# Patient Record
Sex: MALE | Race: WHITE
[De-identification: names, ages, dates, MRNs, and addresses within clinical notes are randomized per-mention and may not be internally consistent; named-entity substitution may affect disease eponyms.]

---

## 2019-01-28 ENCOUNTER — HOSPITAL ENCOUNTER (EMERGENCY)
Dept: HOSPITAL 97 - ER | Age: 24
Discharge: HOME | End: 2019-01-28
Payer: COMMERCIAL

## 2019-01-28 DIAGNOSIS — R11.2: Primary | ICD-10-CM

## 2019-01-28 DIAGNOSIS — R19.7: ICD-10-CM

## 2019-01-28 PROCEDURE — 99283 EMERGENCY DEPT VISIT LOW MDM: CPT

## 2019-01-28 NOTE — EDPHYS
Physician Documentation                                                                           

 [facility]                                                                

Name: Kalia Irving                                                                              

Age: 23 yrs                                                                                       

Sex: Male                                                                                         

: 1995                                                                                   

MRN: [id number]                                                                                   

Arrival Date: 2019                                                                          

Time: 13:18                                                                                       

Account#: [id number]                                                                            

Bed 25                                                                                            

Private MD: None, None                                                                            

ED Physician Ayan Head                                                                      

HPI:                                                                                              

                                                                                             

14:08 This 23 yrs old  Male presents to ER via Ambulatory with complaints of         kb  

      Nausea/Vomiting/Diarrhea.                                                                   

14:08 The patient presents to the emergency department with nausea, vomiting, diarrhea.       kb  

      Onset: The symptoms/episode began/occurred 3 day(s) ago. Possible causes: unknown. The      

      symptoms are aggravated by nothing. The symptoms are alleviated by nothing. Associated      

      signs and symptoms: Pertinent positives: diarrhea, nausea, vomiting, Pertinent              

      negatives: abdominal pain, anorexia, belching, constipation, dysuria, fever,                

      flatulence, GI bleeding, hematuria. Severity of symptoms: At their worst the symptoms       

      were moderate in the emergency department the symptoms are unchanged. The patient has       

      not experienced similar symptoms in the past. The patient has not recently seen a           

      physician. Pt reports n/v/d for 3 days. States "It's no big deal, nothing serious. I        

      just threw up at work today so they made me come in to get checked out." .                  

                                                                                                  

Historical:                                                                                       

- Allergies:                                                                                      

13:29 No Known Allergies;                                                                     sv  

- PMHx:                                                                                           

13:29 None;                                                                                   sv  

- PSHx:                                                                                           

13:29 titanium plate placed in Left foot after previous foot fracture;                        sv  

                                                                                                  

- Immunization history:: Adult Immunizations up to date.                                          

- Social history:: Smoking status: Patient uses tobacco products, denies chronic                  

  smoking, but will smoke occasionally.                                                           

- Ebola Screening: : No symptoms or risks identified at this time.                                

                                                                                                  

                                                                                                  

ROS:                                                                                              

14:10 Constitutional: Negative for fever, chills, and weight loss, Cardiovascular: Negative   kb  

      for chest pain, palpitations, and edema, Respiratory: Negative for shortness of breath,     

      cough, wheezing, and pleuritic chest pain, Back: Negative for injury and pain, :          

      Negative for injury, bleeding, discharge, and swelling, MS/Extremity: Negative for          

      injury and deformity, Skin: Negative for injury, rash, and discoloration, Neuro:            

      Negative for headache, weakness, numbness, tingling, and seizure.                           

14:10 Abdomen/GI: Positive for nausea, vomiting, and diarrhea, Negative for abdominal pain,       

      constipation, abdominal cramps, abdominal distension, anorexia.                             

                                                                                                  

Exam:                                                                                             

14:10 Constitutional:  This is a well developed, well nourished patient who is awake, alert,  kb  

      and in no acute distress. Head/Face:  Normocephalic, atraumatic. Chest/axilla:  Normal      

      chest wall appearance and motion.  Nontender with no deformity.  No lesions are             

      appreciated. Cardiovascular:  Regular rate and rhythm with a normal S1 and S2.  No          

      gallops, murmurs, or rubs.  Normal PMI, no JVD.  No pulse deficits. Respiratory:  Lungs     

      have equal breath sounds bilaterally, clear to auscultation and percussion.  No rales,      

      rhonchi or wheezes noted.  No increased work of breathing, no retractions or nasal          

      flaring. Abdomen/GI:  Soft, non-tender, with normal bowel sounds.  No distension or         

      tympany.  No guarding or rebound.  No evidence of tenderness throughout. Skin:  Warm,       

      dry with normal turgor.  Normal color with no rashes, no lesions, and no evidence of        

      cellulitis. MS/ Extremity:  Pulses equal, no cyanosis.  Neurovascular intact.  Full,        

      normal range of motion. Neuro:  Awake and alert, GCS 15, oriented to person, place,         

      time, and situation.  Cranial nerves II-XII grossly intact.  Motor strength 5/5 in all      

      extremities.  Sensory grossly intact.  Cerebellar exam normal.  Normal gait.                

                                                                                                  

Vital Signs:                                                                                      

13:29  / 68; Pulse 69; Resp 18; Temp 97.4; Pulse Ox 100% ; Weight 117.93 kg; Height 6   sv  

      ft. 3 in. (190.50 cm); Pain 6/10;                                                           

14:31  / 61; Pulse 68; Resp 17; Pulse Ox 99% on R/A;                                    aj  

13:29 Body Mass Index 32.50 (117.93 kg, 190.50 cm)                                            sv  

                                                                                                  

MDM:                                                                                              

13:31 Patient medically screened.                                                             kb  

14:07 Data reviewed: vital signs, nurses notes. Data interpreted: Pulse oximetry: on room air kb  

      is 100 %. Interpretation: normal. Counseling: I had a detailed discussion with the          

      patient and/or guardian regarding: the historical points, exam findings, and any            

      diagnostic results supporting the discharge/admit diagnosis, the need for outpatient        

      follow up, a family practitioner, to return to the emergency department if symptoms         

      worsen or persist or if there are any questions or concerns that arise at home.             

14:10 ED course: Pt does not want blood work done, doesn't think it is necessary.             kb  

                                                                                                  

                                                                                             

13:36 Order name: PO challenge; Complete Time: 13:59                                          kb  

                                                                                                  

Administered Medications:                                                                         

13:47 Drug: Zofran 4 mg Route: PO;                                                            aj  

13:59 Follow up: Response: Nausea is decreased                                                aj  

                                                                                                  

                                                                                                  

Disposition:                                                                                      

15:19 Co-signature as Attending Physician, Ayan Head MD I agree with the assessment and  [facility] 

      plan of care.                                                                               

                                                                                                  

Disposition:                                                                                      

19 14:11 Discharged to Home. Impression: Nausea and vomiting, Diarrhea, unspecified.        

- Condition is Stable.                                                                            

- Discharge Instructions: Food Choices to Help Relieve Diarrhea, Adult, Viral                     

  Gastroenteritis, Adult, Easy-to-Read, Nausea and Vomiting, Adult, Easy-to-Read,                 

  Diarrhea, Adult, Easy-to-Read.                                                                  

- Prescriptions for Zofran 4 mg Oral Tablet - take 1 tablet by ORAL route every 6 hours           

  As needed; 20 tablet.                                                                           

- Medication Reconciliation Form, Thank You Letter, Antibiotic Education, Prescription            

  Opioid Use form.                                                                                

- Work release form (19 16:08).                                                         sv  

- Follow up: Emergency Department; When: As needed; Reason: Worsening of condition.               

  Follow up: Private Physician; When: 2 - 3 days; Reason: Recheck today's complaints,             

  Continuance of care, Re-evaluation by your physician.                                           

                                                                                                  

                                                                                                  

                                                                                                  

Signatures:                                                                                       

Pilar Ramon, JAZ-C                 FNP-Dian Eng RN RN sv Myers, Amanda, RN RN aj Anderson, Corey, MD MD   [facility]                                                  

                                                                                                  

Corrections: (The following items were deleted from the chart)                                    

14:32 14:11 2019 14:11 Discharged to Home. Impression: Nausea and vomiting; Diarrhea,   aj  

      unspecified. Condition is Stable. Forms are Medication Reconciliation Form, Thank You       

      Letter, Antibiotic Education, Prescription Opioid Use. Follow up: Emergency Department;     

      When: As needed; Reason: Worsening of condition. Follow up: Private Physician; When: 2      

      - 3 days; Reason: Recheck today's complaints, Continuance of care, Re-evaluation by         

      your physician. kb                                                                          

                                                                                                  

**************************************************************************************************

## 2019-01-28 NOTE — ER
Nurse's Notes                                                                                     

 [facility]                                                                

Name: Kalia Irving                                                                              

Age: 23 yrs                                                                                       

Sex: Male                                                                                         

: 1995                                                                                   

MRN: [id number]                                                                                   

Arrival Date: 2019                                                                          

Time: 13:18                                                                                       

Account#: [id number]                                                                            

Bed 25                                                                                            

Private MD: None, None                                                                            

Diagnosis: Nausea and vomiting;Diarrhea, unspecified                                              

                                                                                                  

Presentation:                                                                                     

                                                                                             

13:28 Presenting complaint: Patient states: n/v/d x 3 days. Transition of care: patient was   sv  

      not received from another setting of care. Onset of symptoms was 2019. Care     

      prior to arrival: None.                                                                     

13:28 Method Of Arrival: Ambulatory                                                           sv  

13:28 Acuity: DEEPA 3                                                                           sv  

14:32 Risk Assessment: Do you want to hurt yourself or someone else? Patient reports no       aj  

      desire to harm self or others. Initial Sepsis Screen: Does the patient meet any 2           

      criteria? No. Patient's initial sepsis screen is negative. Does the patient have a          

      suspected source of infection? No. Patient's initial sepsis screen is negative.             

                                                                                                  

Triage Assessment:                                                                                

13:29 General: Appears in no apparent distress. comfortable, Behavior is calm, cooperative,   sv  

      appropriate for age. Neuro: Level of Consciousness is awake, alert, obeys commands,         

      Oriented to person, place, time, situation, Gait is steady. Respiratory: Respiratory        

      effort is even, unlabored, Respiratory pattern is regular, symmetrical. GI: Reports         

      diarrhea, nausea, vomiting.                                                                 

                                                                                                  

Historical:                                                                                       

- Allergies:                                                                                      

13:29 No Known Allergies;                                                                     sv  

- PMHx:                                                                                           

13:29 None;                                                                                   sv  

- PSHx:                                                                                           

13:29 titanium plate placed in Left foot after previous foot fracture;                        sv  

                                                                                                  

- Immunization history:: Adult Immunizations up to date.                                          

- Social history:: Smoking status: Patient uses tobacco products, denies chronic                  

  smoking, but will smoke occasionally.                                                           

- Ebola Screening: : No symptoms or risks identified at this time.                                

                                                                                                  

                                                                                                  

Screenin:47 Abuse screen: Denies threats or abuse. Denies injuries from another. Nutritional        aj  

      screening: No deficits noted. Tuberculosis screening: No symptoms or risk factors           

      identified. Fall Risk None identified.                                                      

                                                                                                  

Assessment:                                                                                       

13:47 General: Appears in no apparent distress. comfortable, Behavior is calm, cooperative,   aj  

      appropriate for age. Pain: Denies pain. Neuro: Level of Consciousness is awake, alert,      

      obeys commands, Oriented to person, place, time, situation, Appropriate for age.            

      Respiratory: Airway is patent Respiratory effort is even, unlabored, Respiratory            

      pattern is regular, symmetrical. GI: Reports diarrhea, nausea, vomiting. GI: Abdomen is     

      flat, non-distended. Derm: Skin is intact, is healthy with good turgor, Skin is pink,       

      warm \T\ dry. normal.                                                                       

                                                                                                  

Vital Signs:                                                                                      

13:29  / 68; Pulse 69; Resp 18; Temp 97.4; Pulse Ox 100% ; Weight 117.93 kg; Height 6   sv  

      ft. 3 in. (190.50 cm); Pain 6/10;                                                           

14:31  / 61; Pulse 68; Resp 17; Pulse Ox 99% on R/A;                                    aj  

13:29 Body Mass Index 32.50 (117.93 kg, 190.50 cm)                                            sv  

                                                                                                  

ED Course:                                                                                        

13:18 Patient arrived in ED.                                                                  dl4 

13:18 None, None is Private Physician.                                                        dl4 

13:28 Triage completed.                                                                       sv  

13:29 Arm band placed on.                                                                     sv  

13:30 [facility]                                                        kb  

13:30 Ayan Head MD is Attending Physician.                                             kb  

13:39 Mireya Mendoza, RN is Primary Nurse.                                                     aj  

13:47 Patient has correct armband on for positive identification. Bed in low position.        aj  

13:47 No provider procedures requiring assistance completed. Patient did not have IV access   aj  

      during this emergency room visit.                                                           

                                                                                                  

Administered Medications:                                                                         

13:47 Drug: Zofran 4 mg Route: PO;                                                            aj  

13:59 Follow up: Response: Nausea is decreased                                                aj  

                                                                                                  

                                                                                                  

Outcome:                                                                                          

14:11 Discharge ordered by MD.                                                                kb  

14:31 Discharged to home ambulatory.                                                          aj  

14:31 Condition: good                                                                             

14:31 Discharge instructions given to patient, Instructed on discharge instructions, follow       

      up and referral plans. medication usage, Demonstrated understanding of instructions,        

      follow-up care, medications, Prescriptions given X 1.                                       

14:32 Patient left the ED.                                                                    aj  

                                                                                                  

Signatures:                                                                                       

Pilar Ramon FNP-C FNP-Ckb Verde, Stephanie, RN RN sv Myers, Amanda, RN RN aj Luna, David                                  dl4                                                  

                                                                                                  

Corrections: (The following items were deleted from the chart)                                    

13:30 13:29 Pulse 69bpm; Resp 18bpm; Pulse Ox 100%; Temp 97.4F; 117.93 kg; Height 6 ft. 3     sv  

      in.; BMI: 32.5; Pain 6/10; sv                                                               

                                                                                                  

**************************************************************************************************

## 2021-06-02 ENCOUNTER — HOSPITAL ENCOUNTER (EMERGENCY)
Dept: HOSPITAL 97 - ER | Age: 26
Discharge: HOME | End: 2021-06-02
Payer: COMMERCIAL

## 2021-06-02 VITALS — TEMPERATURE: 98.9 F | OXYGEN SATURATION: 99 % | DIASTOLIC BLOOD PRESSURE: 76 MMHG | SYSTOLIC BLOOD PRESSURE: 137 MMHG

## 2021-06-02 DIAGNOSIS — F10.129: Primary | ICD-10-CM

## 2021-06-02 DIAGNOSIS — T40.7X5A: ICD-10-CM

## 2021-06-02 LAB
ALBUMIN SERPL BCP-MCNC: 4.5 G/DL (ref 3.4–5)
ALP SERPL-CCNC: 72 U/L (ref 45–117)
ALT SERPL W P-5'-P-CCNC: 30 U/L (ref 12–78)
AST SERPL W P-5'-P-CCNC: 23 U/L (ref 15–37)
BUN BLD-MCNC: 14 MG/DL (ref 7–18)
GLUCOSE SERPLBLD-MCNC: 98 MG/DL (ref 74–106)
HCT VFR BLD CALC: 44.6 % (ref 39.6–49)
INR BLD: 1.14
LYMPHOCYTES # SPEC AUTO: 2.1 K/UL (ref 0.7–4.9)
METHAMPHET UR QL SCN: NEGATIVE
PMV BLD: 10.4 FL (ref 7.6–11.3)
POTASSIUM SERPL-SCNC: 3.5 MMOL/L (ref 3.5–5.1)
RBC # BLD: 5.11 M/UL (ref 4.33–5.43)
THC SERPL-MCNC: POSITIVE NG/ML

## 2021-06-02 PROCEDURE — 85730 THROMBOPLASTIN TIME PARTIAL: CPT

## 2021-06-02 PROCEDURE — 85610 PROTHROMBIN TIME: CPT

## 2021-06-02 PROCEDURE — 85025 COMPLETE CBC W/AUTO DIFF WBC: CPT

## 2021-06-02 PROCEDURE — 51702 INSERT TEMP BLADDER CATH: CPT

## 2021-06-02 PROCEDURE — 80076 HEPATIC FUNCTION PANEL: CPT

## 2021-06-02 PROCEDURE — 36415 COLL VENOUS BLD VENIPUNCTURE: CPT

## 2021-06-02 PROCEDURE — 99291 CRITICAL CARE FIRST HOUR: CPT

## 2021-06-02 PROCEDURE — 80320 DRUG SCREEN QUANTALCOHOLS: CPT

## 2021-06-02 PROCEDURE — 96374 THER/PROPH/DIAG INJ IV PUSH: CPT

## 2021-06-02 PROCEDURE — 81003 URINALYSIS AUTO W/O SCOPE: CPT

## 2021-06-02 PROCEDURE — 80048 BASIC METABOLIC PNL TOTAL CA: CPT

## 2021-06-02 PROCEDURE — 82947 ASSAY GLUCOSE BLOOD QUANT: CPT

## 2021-06-02 PROCEDURE — 96361 HYDRATE IV INFUSION ADD-ON: CPT

## 2021-06-02 PROCEDURE — 80329 ANALGESICS NON-OPIOID 1 OR 2: CPT

## 2021-06-02 PROCEDURE — 80307 DRUG TEST PRSMV CHEM ANLYZR: CPT

## 2021-06-02 PROCEDURE — 99292 CRITICAL CARE ADDL 30 MIN: CPT

## 2021-06-02 PROCEDURE — 93005 ELECTROCARDIOGRAM TRACING: CPT

## 2021-06-02 NOTE — EKG
Test Date:    2021-06-02               Test Time:    03:23:17

Technician:   LAMINE                                     

                                                     

MEASUREMENT RESULTS:                                       

Intervals:                                           

Rate:         62                                     

AR:           164                                    

QRSD:         100                                    

QT:           428                                    

QTc:          434                                    

Axis:                                                

P:            48                                     

AR:           164                                    

QRS:          49                                     

T:            67                                     

                                                     

INTERPRETIVE STATEMENTS:                                       

                                                     

Normal sinus rhythm

Normal ECG

No previous ECG available for comparison



Electronically Signed On 06-02-21 11:53:30 CDT by Yusuf Romero

## 2021-06-02 NOTE — ER
Nurse's Notes                                                                                     

 [facility]                                                                 

Name: Kalia Irving                                                                              

Age: 25 yrs                                                                                       

Sex: Male                                                                                         

: 1995                                                                                   

MRN: [id number]                                                                                   

Arrival Date: 2021                                                                          

Time: 03:12                                                                                       

Account#: [id number]                                                                            

Bed 3                                                                                             

Private MD:                                                                                       

Diagnosis: Alcohol abuse with intoxication;Adverse effect of marijuana use                        

                                                                                                  

Presentation:                                                                                     

                                                                                             

03:15 Chief complaint: Parent and/or Guardian states: He was at a friends house tonight       jb4 

      drinking. They said he had a lot to drink on an empty stomach. Coronavirus screen:          

      Client denies travel out of the [place] in the last 14 days. At this time, the client does     

      not indicate any symptoms associated with coronavirus-19. Ebola Screen: No symptoms or      

      risks identified at this time.                                                              

03:15 Method Of Arrival: Wheelchair                                                           jb4 

03:15 Initial Sepsis Screen: Does the patient meet any 2 criteria? No. Patient's initial      jb4 

      sepsis screen is negative. Does the patient have a suspected source of infection? No.       

      Patient's initial sepsis screen is negative. Risk Assessment: Do you want to hurt           

      yourself or someone else? Patient reports no desire to harm self or others. Onset of        

      symptoms was 2021. Transition of care: patient was not received from another       

      setting of care.                                                                            

03:15 Acuity: DEEPA 1                                                                           jb4 

                                                                                                  

Historical:                                                                                       

- Allergies:                                                                                      

03:40 No Known Allergies;                                                                     jb4 

- Home Meds:                                                                                      

03:40 None [Active];                                                                          jb4 

- PMHx:                                                                                           

03:40 None;                                                                                   jb4 

- PSHx:                                                                                           

03:40 None;                                                                                   jb4 

                                                                                                  

- Immunization history:: Adult Immunizations unknown.                                             

- Social history:: Smoking status: unknown Patient uses alcohol, street drugs,                    

  marijuana.                                                                                      

- Family history:: not pertinent.                                                                 

- Hospitalizations: : No recent hospitalization is reported.                                      

- History obtained from: mother.                                                                  

                                                                                                  

                                                                                                  

Screenin:30 Abuse screen: Denies threats or abuse. Nutritional screening: No deficits noted.        jb4 

      Tuberculosis screening: No symptoms or risk factors identified. Fall Risk Gait-             

      Impaired (20 pts.). Mental Status- Overestimates/Forgets Limitations (15 pts.). Total       

      Redman Fall Scale indicates High Risk Score (45 or more points). Fall prevention             

      measures have been instituted. Side Rails Up X 2 Placed Close to Nursing Station            

      Frequent Obs/Assessments Occuring.                                                          

                                                                                                  

Assessment:                                                                                       

03:15 General: Appears distressed, ill, obese, Behavior is uncooperative, Smells of alcohol,  jb4 

      Smoke. Pain: Unable to use pain scale. FLACC scale score is 0 out of 10. Neuro: Level       

      of Consciousness is lethargic, Oriented to person, place, time, situation.                  

      Cardiovascular: Skin is diaphoretic and warm. Respiratory: Airway is compromised            

      Respiratory effort is gasping, weak, Respiratory pattern is symmetrical, periods of         

      apnea noted with periods of regular breathing. GI: No signs and/or symptoms were            

      reported involving the gastrointestinal system. : No signs and/or symptoms were           

      reported regarding the genitourinary system. EENT: No signs and/or symptoms were            

      reported regarding the EENT system. Derm: Skin is intact, Skin is diaphoretic, Skin is      

      normal, Skin temperature is warm. Musculoskeletal: Circulation, motion, and sensation       

      intact. Range of motion: intact in all extremities.                                         

03:30 Reassessment: Nasal trumpet inserted to left nare per providers orders.                 jb4 

03:45 Reassessment: Pt's skin is now warm and dry. Respirations are even and unlabored. No    jb4 

      s/s of distress noted.                                                                      

04:45 Reassessment: Patient and/or family updated on plan of care and expected duration. Pain jb4 

      level reassessed. Pt awakens more easily, remain lethargic. Respirations continue to be     

      even and unlabored with no s/s of pain or distress noted.                                   

05:15 Reassessment: Pt is now awake, alert and oriented x4. Responds spontaneously to voice   jb4 

      and other sounds. Is requesting to have nasal trumpet removed. Provider notified,           

      Provider okayed nasal trumpet to be removed.                                                

07:20 Reassessment: pt laying in bed with eyes closed, respirations even and unlabored, will  jl7 

      be discharged once his mom arrives.                                                         

08:00 Reassessment: Pt's mom here to take pt home, woke pt up, removed Hitchcock and discharged   jl7 

      pt home via wheelchair with his mom.                                                        

                                                                                                  

Vital Signs:                                                                                      

03:15  / 58; Pulse 60; Resp 13; Temp 96.7(O); Pulse Ox 98% on R/A; Weight 117.93 kg     jb4 

      (R); Height 6 ft. 1 in. (185.42 cm); Pain 0/10;                                             

05:00  / 97; Pulse 58; Resp 19; Temp 98.5(C); Pulse Ox 100% on R/A;                     jb4 

05:30  / 99; Pulse 63; Resp 23; Temp 98.9(C); Pulse Ox 100% on R/A;                     jb4 

07:20  / 58; Pulse 69; Resp 19 S; Temp 99.1(C); Pulse Ox 98% on R/A;                    jl7 

08:00  / 76; Pulse 73; Resp 15 S; Temp 98.9(C); Pulse Ox 99% on R/A;                    jl7 

03:15 Body Mass Index 34.30 (117.93 kg, 185.42 cm)                                            jb4 

                                                                                                  

ED Course:                                                                                        

03:12 Patient arrived in ED.                                                                  am4 

03:13 Adalberto Dykes MD is Attending Physician.                                                rn  

03:15 Initial lab(s) drawn, by me, sent to lab. Inserted saline lock: 18 gauge in right       jb4 

      antecubital area, using aseptic technique. Blood collected.                                 

03:25 EKG done.                                                                               jb4 

03:30 Patient has correct armband on for positive identification. Placed in gown. Bed in low  jb4 

      position. Call light in reach. Side rails up X 1. Cardiac monitor on. Pulse ox on. NIBP     

      on.                                                                                         

03:30 Hitchcock cath inserted, using sterile technique, 16 Fr., by me, balloon inflated, to       jb4 

      gravity drainage, urine specimen collected. returned eulogio urine. Patient tolerated         

      well. Patient maintains SpO2 saturation greater than 95% on room air. O2 via Nasal          

      trumpet inserted per Dr. Dykes.                                                             

03:39 Triage completed.                                                                       jb4 

03:40 Arm band placed on right wrist.                                                         jb4 

03:48 Finesse Alvarez, RN is Primary Nurse.                                                     jb4 

07:37 Primary Nurse role handed off by Finesse Alvarez, RN                                      bd  

08:00 No provider procedures requiring assistance completed. Hitchcock cath removed intact,       jl7 

      balloon deflated. IV discontinued, intact, bleeding controlled, No redness/swelling at      

      site. Pressure dressing applied.                                                            

                                                                                                  

Administered Medications:                                                                         

04:14 Drug: NS 0.9% 1000 ml Route: IV; Rate: 1000 ml; Site: left antecubital;                 jb4 

07:00 Follow up: IV Intake: 1000ml                                                            jl7 

05:30 Drug: D5-1/2 NS 1000 ml Route: IV; Rate: 150 ml/hr; Site: left antecubital;             jb4 

08:00 Follow up: IV Status: Completed infusion; IV Intake: 350ml                              jl7 

05:30 Drug: D50W 50 ml Route: IVP; Site: left antecubital;                                    jb4 

                                                                                                  

                                                                                                  

Intake:                                                                                           

07:00 IV: 1000ml; Total: 1000ml.                                                              jl7 

08:00 IV: 350ml; Total: 1350ml.                                                               jl7 

                                                                                                  

Outcome:                                                                                          

06:55 Discharge ordered by MD.                                                                rn  

08:00 Discharged to home via wheelchair, with family.                                         jl7 

08:00 Condition: stable                                                                           

08:00 Discharge instructions given to patient, Instructed on discharge instructions, follow       

      up and referral plans. Demonstrated understanding of instructions, follow-up care.          

09:42 Patient left the ED.                                                                    jl7 

                                                                                                  

Signatures:                                                                                       

Lissette Hall Brenda, RN                     RN   Adalberto Phan MD MD rn Bryson, James, RN                       RN   jb4                                                  

Phong Hawley RN                        RN   Cheryl Tineo am4                                                  

                                                                                                  

Corrections: (The following items were deleted from the chart)                                    

03:18 03:18 Chief complaint: bb                                                               jose luis  

05:05 03:15  / 58; Pulse 60bpm; Resp 13bpm; Pulse Ox 98% RA; Temp 96.7F Oral; jb4       jb4 

                                                                                                  

**************************************************************************************************

## 2021-06-02 NOTE — EDPHYS
Physician Documentation                                                                           

 [facility]                                                                 

Name: Kalia Irving                                                                              

Age: 25 yrs                                                                                       

Sex: Male                                                                                         

: 1995                                                                                   

MRN: [id number]                                                                                   

Arrival Date: 2021                                                                          

Time: 03:12                                                                                       

Account#: [id number]                                                                            

Bed 3                                                                                             

Private MD:                                                                                       

ED Physician Adalberto Dykes                                                                         

HPI:                                                                                              

                                                                                             

05:11 This 25 yrs old  Male presents to ER via Wheelchair with complaints of         rn  

      Unresponsive.                                                                               

05:11 The patient presents with decreased responsiveness. Onset: The symptoms/episode         rn  

      began/occurred just prior to arrival. Possible causes: drug use, alcohol. Associated        

      signs and symptoms: Pertinent negatives: seizure. Current symptoms: In the emergency        

      department the patient's symptoms are unchanged from the initial presentation. The          

      patient has not experienced similar symptoms in the past. The patient has not recently      

      seen a physician. Mother brought patient in for eval, states unresponsive, was at a         

      house party and admitted to drinking a lot. Unknown if any drug use..                       

                                                                                                  

Historical:                                                                                       

- Allergies:                                                                                      

03:40 No Known Allergies;                                                                     jb4 

- Home Meds:                                                                                      

03:40 None [Active];                                                                          jb4 

- PMHx:                                                                                           

03:40 None;                                                                                   jb4 

- PSHx:                                                                                           

03:40 None;                                                                                   jb4 

                                                                                                  

- Immunization history:: Adult Immunizations unknown.                                             

- Social history:: Smoking status: unknown Patient uses alcohol, street drugs,                    

  marijuana.                                                                                      

- Family history:: not pertinent.                                                                 

- Hospitalizations: : No recent hospitalization is reported.                                      

- History obtained from: mother.                                                                  

                                                                                                  

                                                                                                  

ROS:                                                                                              

05:11 Unable to obtain ROS due to altered mental status.                                      rn  

                                                                                                  

Exam:                                                                                             

05:11 Constitutional:  This is a well developed, well nourished patient who is somnolent,     rn  

      drooling Head/Face:  Normocephalic, atraumatic. Eyes:  Pupils equal round and reactive      

      to light, extra-ocular motions intact. Periorbital areas with no swelling, redness, or      

      edema. ENT:  dry MM, no signs of tongue biting or trauma. Cardiovascular:  Regular rate     

      and rhythm.  No pulse deficits. Respiratory:  No increased work of breathing, no            

      retractions or nasal flaring. Abdomen/GI:  soft, non-tender Skin:  Warm, dry MS/            

      Extremity:  Pulses equal, no cyanosis.  Neuro:  Somnolent, awakens with jaw thrust          

                                                                                                  

Vital Signs:                                                                                      

03:15  / 58; Pulse 60; Resp 13; Temp 96.7(O); Pulse Ox 98% on R/A; Weight 117.93 kg     jb4 

      (R); Height 6 ft. 1 in. (185.42 cm); Pain 0/10;                                             

05:00  / 97; Pulse 58; Resp 19; Temp 98.5(C); Pulse Ox 100% on R/A;                     jb4 

05:30  / 99; Pulse 63; Resp 23; Temp 98.9(C); Pulse Ox 100% on R/A;                     jb4 

07:20  / 58; Pulse 69; Resp 19 S; Temp 99.1(C); Pulse Ox 98% on R/A;                    jl7 

08:00  / 76; Pulse 73; Resp 15 S; Temp 98.9(C); Pulse Ox 99% on R/A;                    jl7 

03:15 Body Mass Index 34.30 (117.93 kg, 185.42 cm)                                            jb4 

                                                                                                  

MDM:                                                                                              

03:13 Patient medically screened.                                                             rn  

06:53 Differential Diagnosis: alcohol intoxication, hypoglycemia, overdose, volume depletion. rn  

      Data reviewed: vital signs, nurses notes, lab test result(s), and as a result, I will       

      discharge patient. Counseling: I had a detailed discussion with the patient and/or          

      guardian regarding: the historical points, exam findings, and any diagnostic results        

      supporting the discharge/admit diagnosis, lab results, the need for outpatient follow       

      up, to return to the emergency department if symptoms worsen or persist or if there are     

      any questions or concerns that arise at home. Response to treatment: the patient's          

      symptoms have markedly improved after treatment, and as a result, I will discharge          

      patient. ED course: Much more alert, awakens to voice now, sits up, keeps full              

      conversation, stable vitals. Will dc home when mother comes back to take him home and       

      continue to sober up. Counseled patient about drug use and ETOH intoxication and            

      dangers of both..                                                                           

                                                                                                  

                                                                                             

03:14 Order name: Ptt, Activated; Complete Time: 04:39                                        rn  

                                                                                             

03:14 Order name: PT-INR; Complete Time: 04:39                                                rn  

                                                                                             

03:14 Order name: Acetaminophen; Complete Time: 04:39                                         rn  

                                                                                             

03:14 Order name: Basic Metabolic Panel; Complete Time: 04:39                                 rn  

06/02                                                                                             

03:14 Order name: CBC with Diff; Complete Time: 04:39                                         rn  

                                                                                             

03:14 Order name: ETOH Level; Complete Time: 04:39                                            rn  

                                                                                             

03:14 Order name: Hepatic Function; Complete Time: 04:39                                      rn  

                                                                                             

03:14 Order name: Salicylate; Complete Time: 04:39                                            rn  

                                                                                             

03:14 Order name: Urine Drug Screen; Complete Time: 04:39                                     rn  

02                                                                                             

03:33 Order name: Glucose, Ancillary Testing; Complete Time: 04:39                            EDMS

02                                                                                             

03:43 Order name: Urine Dipstick-Ancillary; Complete Time: 04:39                              EDMS

                                                                                             

05:34 Order name: Glucose, Ancillary Testing; Complete Time: 05:36                            EDMS

                                                                                             

03:14 Order name: EKG; Complete Time: 03:15                                                   rn  

                                                                                             

03:14 Order name: EKG - Nurse/Tech; Complete Time: 03:36                                      rn  

                                                                                             

03:14 Order name: IV Saline Lock; Complete Time: 03:36                                        rn  

                                                                                             

03:14 Order name: Labs collected and sent; Complete Time: 03:36                               rn  

                                                                                             

03:14 Order name: Urine Dipstick-Ancillary (obtain specimen); Complete Time: 03:36            rn  

                                                                                             

03:14 Order name: Cath; Complete Time: 03:36                                                  rn  

                                                                                                  

Administered Medications:                                                                         

04:14 Drug: NS 0.9% 1000 ml Route: IV; Rate: 1000 ml; Site: left antecubital;                 4 

07:00 Follow up: IV Intake: 1000ml                                                            jl7 

05:30 Drug: D5-1/2 NS 1000 ml Route: IV; Rate: 150 ml/hr; Site: left antecubital;             4 

08:00 Follow up: IV Status: Completed infusion; IV Intake: 350ml                              jl7 

05:30 Drug: D50W 50 ml Route: IVP; Site: left antecubital;                                    4 

                                                                                                  

                                                                                                  

Disposition:                                                                                      

21 06:55 Discharged to Home. Impression: Alcohol abuse with intoxication, Adverse effect    

  of marijuana use.                                                                               

- Condition is Stable.                                                                            

- Discharge Instructions: Alcohol Intoxication.                                                   

                                                                                                  

- Medication Reconciliation Form, Thank You Letter, Antibiotic Education, Prescription            

  Opioid Use form.                                                                                

- Work release form (21 00:48).                                                         iw  

- Follow up: Private Physician; When: As needed; Reason: Recheck today's complaints,              

  Re-evaluation by your physician.                                                                

- Problem is new.                                                                                 

- Symptoms have improved.                                                                         

                                                                                                  

                                                                                                  

                                                                                                  

Signatures:                                                                                       

Dispatcher [employer]                           EDMS                                                 

Adalberto Dykes MD MD rn Bryson, James, RN                       RN   jb4                                                  

Phong Hawley RN                        RN   jl7                                                  

Marissa Proctor RN   iw                                                   

                                                                                                  

Corrections: (The following items were deleted from the chart)                                    

09:42 06:55 2021 06:55 Discharged to Home. Impression: Alcohol abuse with intoxication; jl7 

      Adverse effect of marijuana use. Condition is Stable. Forms are Medication                  

      Reconciliation Form, Thank You Letter, Antibiotic Education, Prescription Opioid Use.       

      Follow up: Private Physician; When: As needed; Reason: Recheck today's complaints,          

      Re-evaluation by your physician. Problem is new. Symptoms have improved. rn                 

                                                                                                  

**************************************************************************************************

## 2022-07-26 ENCOUNTER — HOSPITAL ENCOUNTER (EMERGENCY)
Dept: HOSPITAL 97 - ER | Age: 27
LOS: 1 days | Discharge: HOME | End: 2022-07-27
Payer: COMMERCIAL

## 2022-07-26 DIAGNOSIS — B34.9: Primary | ICD-10-CM

## 2022-07-26 DIAGNOSIS — Z20.822: ICD-10-CM

## 2022-07-26 DIAGNOSIS — F17.210: ICD-10-CM

## 2022-07-26 PROCEDURE — 99281 EMR DPT VST MAYX REQ PHY/QHP: CPT

## 2022-07-26 PROCEDURE — 87804 INFLUENZA ASSAY W/OPTIC: CPT

## 2022-07-27 VITALS — DIASTOLIC BLOOD PRESSURE: 71 MMHG | SYSTOLIC BLOOD PRESSURE: 112 MMHG

## 2022-07-27 VITALS — OXYGEN SATURATION: 100 % | TEMPERATURE: 98.9 F

## 2022-07-27 NOTE — ER
Nurse's Notes                                                                                     

 [facility]                                                                 

Name: Kalia Irving                                                                              

Age: 26 yrs                                                                                       

Sex: Male                                                                                         

: 1995                                                                                   

MRN: [id number]                                                                                   

Arrival Date: 2022                                                                          

Time: 20:57                                                                                       

Account#: [id number]                                                                            

Bed 11                                                                                            

Private MD:                                                                                       

Diagnosis: Viral infection, unspecified;Nausea with vomiting, unspecified-Resolved                

                                                                                                  

Presentation:                                                                                     

                                                                                             

21:03 Chief complaint: N/V/D yesterday after lunch, sore throat and headache today.           hb  

      Coronavirus screen: Client presents with at least one sign or symptom that may indicate     

      coronavirus-19. Standard/surgical mask placed on the client. Provider contacted for         

      isolation considerations. Ebola Screen: No symptoms or risks identified at this time.       

      Initial Sepsis Screen: Does the patient meet any 2 criteria? No. Patient's initial          

      sepsis screen is negative. Does the patient have a suspected source of infection? No.       

      Patient's initial sepsis screen is negative. Risk Assessment: Do you want to hurt           

      yourself or someone else? Patient reports no desire to harm self or others. Onset of        

      symptoms was 2022.                                                                 

21:03 Method Of Arrival: Ambulatory                                                             

21:03 Acuity: DEEPA 3                                                                           hb  

                                                                                                  

Triage Assessment:                                                                                

21:05 General: Appears in no apparent distress. Behavior is calm, cooperative. Pain: Pain     hb  

      currently is 4 out of 10 on a pain scale. Neuro: Level of Consciousness is awake,           

      alert, obeys commands, Oriented to person, place, time, situation. Cardiovascular:          

      Patient's skin is warm and dry. Respiratory: Respiratory effort is even, unlabored.         

                                                                                                  

Historical:                                                                                       

- Allergies:                                                                                      

21:05 No Known Allergies;                                                                     hb  

- Home Meds:                                                                                      

21:05 None [Active];                                                                          hb  

- PMHx:                                                                                           

21:05 None;                                                                                   hb  

- PSHx:                                                                                           

21:05 Foot - Left; Hip - left;                                                                hb  

                                                                                                  

- Immunization history:: Adult Immunizations up to date.                                          

- Social history:: Smoking status: Patient reports the use of cigarette tobacco                   

  products, denies chronic smoking, but will smoke occasionally.                                  

                                                                                                  

                                                                                                  

Screenin:27 Abuse screen: Denies threats or abuse. Nutritional screening: No deficits noted.        [facility] 

      Tuberculosis screening: No symptoms or risk factors identified. Fall Risk None              

      identified.                                                                                 

                                                                                                  

Assessment:                                                                                       

23:27 Reassessment: No changes from previously documented assessment. GI: Reports nausea.     [facility] 

                                                                                                  

Vital Signs:                                                                                      

21:03  / 65; Pulse 63; Resp 16; Temp 98.9; Pulse Ox 100% on R/A; Weight 108.86 kg;      hb  

      Height 6 ft. 3 in. (190.50 cm); Pain 4/10;                                                  

23:40  / 71; Pulse 46; Resp 20; Pulse Ox 100% on R/A;                                   1 

                                                                                             

01:20  / 76; Pulse 46; Resp 20; Pulse Ox 100% on R/A;                                   1 

                                                                                             

21:03 Body Mass Index 30.00 (108.86 kg, 190.50 cm)                                            hb  

                                                                                                  

ED Course:                                                                                        

                                                                                             

20:57 Patient arrived in ED.                                                                  2 

21:05 Triage completed.                                                                         

21:05 Arm band placed on.                                                                       

23:14 Carline Person, RN is Primary Nurse.                                                    [facility] 

23:27 Primary Nurse role handed off by Carline Person, RACIEL                                     [facility] 

23:27 Lissette Abdalla, RN is Primary Nurse.                                                    [facility] 

23:27 Patient has correct armband on for positive identification.                             [facility] 

23:27 No provider procedures requiring assistance completed. Patient did not have IV access   [facility] 

      during this emergency room visit.                                                           

23:37 Rachid Ramirez MD is Attending Physician.                                              kdr 

23:40 No apparent distress. Resting quietly. Awaiting ED provider evaluation.                 [facility] 

                                                                                             

00:24 No apparent distress. Resting quietly. Awaiting disposition.                            [facility] 

                                                                                                  

Administered Medications:                                                                         

No medications were administered                                                                  

                                                                                                  

                                                                                                  

Medication:                                                                                       

                                                                                             

23:27 VIS not applicable for this client.                                                     [facility] 

                                                                                                  

Outcome:                                                                                          

                                                                                             

01:13 Discharge ordered by MD.                                                                kdr 

01:20 Discharged to home ambulatory.                                                          [facility] 

01:20 Condition: good                                                                             

01:20 Discharge instructions given to patient, Instructed on discharge instructions, follow       

      up and referral plans. medication usage, Demonstrated understanding of instructions,        

      follow-up care, medications.                                                                

01:20 Patient left the ED.                                                                    [facility] 

                                                                                                  

Signatures:                                                                                       

Rachid Ramirez MD MD kdr Baxter, Heather, RN RN                                                      

Kasey Read                           [facility]                                                  

Carline Person RN                      RN   3                                                  

Lissette Abdalla RN                      RN   [facility]                                                  

                                                                                                  

**************************************************************************************************

## 2022-07-27 NOTE — EDPHYS
Physician Documentation                                                                           

 [facility]                                                                 

Name: Kalia Irving                                                                              

Age: 26 yrs                                                                                       

Sex: Male                                                                                         

: 1995                                                                                   

MRN: [id number]                                                                                   

Arrival Date: 2022                                                                          

Time: 20:57                                                                                       

Account#: [id number]                                                                            

Bed 11                                                                                            

Private MD:                                                                                       

ED Physician Rachid Ramirez                                                                       

HPI:                                                                                              

                                                                                             

02:50 This 26 yrs old  Male presents to ER via Ambulatory with complaints of             kdr 

      Nausea/Vomiting.                                                                            

02:50 The patient presents to the emergency department with nausea, that is mild, vomiting,   kdr 

      that is intermittent, abdominal pain, of the abdomen diffusely, described as achy,          

      crampy. Onset: The symptoms/episode began/occurred suddenly, last night. Possible           

      causes: unknown. The symptoms are aggravated by nothing. The symptoms are alleviated by     

      nothing. Associated signs and symptoms: Pertinent positives: abdominal pain, nausea,        

      vomiting. Severity of symptoms: At their worst the symptoms were mild moderate just         

      prior to arrival, in the emergency department the symptoms have resolved. The patient       

      has not experienced similar symptoms in the past. The patient has not recently seen a       

      physician. Sent by his workplace for medical clearance and evaluation.                      

                                                                                                  

Historical:                                                                                       

- Allergies:                                                                                      

                                                                                             

21:05 No Known Allergies;                                                                     hb  

- Home Meds:                                                                                      

21:05 None [Active];                                                                          hb  

- PMHx:                                                                                           

21:05 None;                                                                                   hb  

- PSHx:                                                                                           

21:05 Foot - Left; Hip - left;                                                                hb  

                                                                                                  

- Immunization history:: Adult Immunizations up to date.                                          

- Social history:: Smoking status: Patient reports the use of cigarette tobacco                   

  products, denies chronic smoking, but will smoke occasionally.                                  

                                                                                                  

                                                                                                  

ROS:                                                                                              

                                                                                             

02:50 Constitutional: Negative for fever, chills, and weight loss, Eyes: Negative for injury, kdr 

      pain, redness, and discharge, ENT: Negative for injury, pain, and discharge, Neck:          

      Negative for injury, pain, and swelling, Cardiovascular: Negative for chest pain,           

      palpitations, and edema, Respiratory: Negative for shortness of breath, cough,              

      wheezing, and pleuritic chest pain, Back: Negative for injury and pain, : Negative        

      for injury, bleeding, discharge, and swelling, MS/Extremity: Negative for injury and        

      deformity, Skin: Negative for injury, rash, and discoloration, Neuro: Negative for          

      headache, weakness, numbness, tingling, and seizure activity. Psych: Negative for           

      depression, anxiety, suicide ideation, homicidal ideation, and hallucinations,              

      Allergy/Immunology: Negative for hives, rash, and allergies, Endocrine: Negative for        

      neck swelling, polydipsia, polyuria, polyphagia, and marked weight changes,                 

      Hematologic/Lymphatic: Negative for swollen nodes, abnormal bleeding, and unusual           

      bruising.                                                                                   

      Abdomen/GI: Positive for abdominal pain, nausea, vomiting, and diarrhea, abdominal          

      cramps.                                                                                     

                                                                                                  

Exam:                                                                                             

02:50 Constitutional:  This is a well developed, well nourished patient who is awake, alert,  kdr 

      and in no acute distress. Head/Face:  Normocephalic, atraumatic. Eyes:  Pupils equal        

      round and reactive to light, extra-ocular motions intact.  Lids and lashes normal.          

      Conjunctiva and sclera are non-icteric and not injected.  Cornea within normal limits.      

      Periorbital areas with no swelling, redness, or edema. Neck:  Trachea midline, no           

      thyromegaly or masses palpated, and no cervical lymphadenopathy.  Supple, full range of     

      motion without nuchal rigidity, or vertebral point tenderness.  No Meningismus.             

      Chest/axilla:  Normal chest wall appearance and motion.  Nontender with no deformity.       

      No lesions are appreciated. Cardiovascular:  Regular rate and rhythm with a normal S1       

      and S2.  No gallops, murmurs, or rubs.  Normal PMI, no JVD.  No pulse deficits.             

      Respiratory:  Lungs have equal breath sounds bilaterally, clear to auscultation and         

      percussion.  No rales, rhonchi or wheezes noted.  No increased work of breathing, no        

      retractions or nasal flaring. Abdomen/GI:  Soft, non-tender, with normal bowel sounds.      

      No distension or tympany.  No guarding or rebound.  No evidence of tenderness               

      throughout. Back:  No spinal tenderness.  No costovertebral tenderness.  Full range of      

      motion. Skin:  Warm, dry with normal turgor.  Normal color with no rashes, no lesions,      

      and no evidence of cellulitis. MS/ Extremity:  Pulses equal, no cyanosis.                   

      Neurovascular intact.  Full, normal range of motion. Neuro:  Awake and alert, GCS 15,       

      oriented to person, place, time, and situation.  Cranial nerves II-XII grossly intact.      

      Motor strength 5/5 in all extremities.  Sensory grossly intact.  Cerebellar exam            

      normal.  Normal gait. Psych:  Awake, alert, with orientation to person, place and time.     

       Behavior, mood, and affect are within normal limits.                                       

                                                                                                  

Vital Signs:                                                                                      

                                                                                             

21:03  / 65; Pulse 63; Resp 16; Temp 98.9; Pulse Ox 100% on R/A; Weight 108.86 kg;      hb  

      Height 6 ft. 3 in. (190.50 cm); Pain 4/10;                                                  

23:40  / 71; Pulse 46; Resp 20; Pulse Ox 100% on R/A;                                   1 

                                                                                             

01:20  / 76; Pulse 46; Resp 20; Pulse Ox 100% on R/A;                                   1 

                                                                                             

21:03 Body Mass Index 30.00 (108.86 kg, 190.50 cm)                                            hb  

                                                                                                  

MDM:                                                                                              

01:13 Patient medically screened.                                                             kdr 

02:50 Data reviewed: vital signs, nurses notes, lab test result(s), radiologic studies.       kdr 

      Counseling: I had a detailed discussion with the patient and/or guardian regarding: the     

      historical points, exam findings, and any diagnostic results supporting the                 

      discharge/admit diagnosis, lab results, radiology results, the need for outpatient          

      follow up.                                                                                  

                                                                                                  

                                                                                             

21:08 Order name: Flu; Complete Time: 00:12                                                   hb  

                                                                                             

21:08 Order name: COVID-19 SARS RT PCR (Document "Date of Onset" if Symptomatic); Complete    hb  

      Time: 00:12                                                                                 

                                                                                             

00:20 Order name: PO challenge; Complete Time: 00:25                                          kdr 

                                                                                                  

Administered Medications:                                                                         

No medications were administered                                                                  

                                                                                                  

                                                                                                  

Disposition Summary:                                                                              

22 01:13                                                                                    

Discharge Ordered                                                                                 

      Location: Home                                                                          kdr 

      Problem: new                                                                            kdr 

      Symptoms: have improved                                                                 kdr 

      Condition: Stable                                                                       kdr 

      Diagnosis                                                                                   

        - Viral infection, unspecified                                                        kdr 

        - Nausea with vomiting, unspecified - Resolved                                        kdr 

      Followup:                                                                               kdr 

        - With: Private Physician                                                                  

        - When: 2 - 3 days                                                                         

        - Reason: If symptoms return, Further diagnostic work-up, Recheck today's complaints,      

      Continuance of care, Re-evaluation by your physician                                        

      Discharge Instructions:                                                                     

        - Discharge Summary Sheet                                                             kdr 

        - Nausea, Adult, Easy-to-Read                                                         kdr 

        - Vomiting, Adult                                                                     kdr 

        - Viral Illness, Adult                                                                kdr 

      Forms:                                                                                      

        - Medication Reconciliation Form                                                      kdr 

        - Thank You Letter                                                                    kdr 

        - Work release form                                                                   [facility] 

      Prescriptions:                                                                              

        - Zofran 4 mg Oral Tablet                                                                  

            - take 1 tablet by ORAL route every 12 hours As needed; 12 tablet; Refills: 0,    kdr 

      Product Selection Permitted                                                                 

Signatures:                                                                                       

Dispatcher [employer]                           EDMS                                                 

Ashley, Rachid, MD                      MD   kdr                                                  

Linda Larry, RN                     RN   hb                                                   

                                                                                                  

**************************************************************************************************